# Patient Record
Sex: FEMALE | Race: WHITE | NOT HISPANIC OR LATINO
[De-identification: names, ages, dates, MRNs, and addresses within clinical notes are randomized per-mention and may not be internally consistent; named-entity substitution may affect disease eponyms.]

---

## 2017-09-28 ENCOUNTER — CHARTING TRANS (OUTPATIENT)
Dept: OTHER | Age: 72
End: 2017-09-28

## 2017-11-29 PROCEDURE — 82570 ASSAY OF URINE CREATININE: CPT | Performed by: INTERNAL MEDICINE

## 2017-11-29 PROCEDURE — 82043 UR ALBUMIN QUANTITATIVE: CPT | Performed by: INTERNAL MEDICINE

## 2017-12-05 PROBLEM — J32.9 SINUSITIS, UNSPECIFIED CHRONICITY, UNSPECIFIED LOCATION: Status: ACTIVE | Noted: 2017-12-05

## 2017-12-05 PROBLEM — I10 ESSENTIAL HYPERTENSION: Status: ACTIVE | Noted: 2017-12-05

## 2017-12-05 PROBLEM — E11.8 TYPE 2 DIABETES MELLITUS WITH COMPLICATION, WITHOUT LONG-TERM CURRENT USE OF INSULIN (HCC): Status: ACTIVE | Noted: 2017-12-05

## 2018-02-17 ENCOUNTER — CHARTING TRANS (OUTPATIENT)
Dept: OTHER | Age: 73
End: 2018-02-17

## 2018-04-24 ENCOUNTER — CHARTING TRANS (OUTPATIENT)
Dept: OTHER | Age: 73
End: 2018-04-24

## 2018-06-22 PROCEDURE — 82043 UR ALBUMIN QUANTITATIVE: CPT | Performed by: INTERNAL MEDICINE

## 2018-06-22 PROCEDURE — 82570 ASSAY OF URINE CREATININE: CPT | Performed by: INTERNAL MEDICINE

## 2018-10-26 ENCOUNTER — CHARTING TRANS (OUTPATIENT)
Dept: OTHER | Age: 73
End: 2018-10-26

## 2018-11-01 VITALS
DIASTOLIC BLOOD PRESSURE: 90 MMHG | SYSTOLIC BLOOD PRESSURE: 125 MMHG | WEIGHT: 240.08 LBS | BODY MASS INDEX: 44.18 KG/M2 | HEART RATE: 103 BPM | TEMPERATURE: 98.8 F | HEIGHT: 62 IN | RESPIRATION RATE: 18 BRPM | OXYGEN SATURATION: 95 %

## 2018-11-01 VITALS
HEART RATE: 65 BPM | HEIGHT: 62 IN | RESPIRATION RATE: 16 BRPM | BODY MASS INDEX: 42.88 KG/M2 | TEMPERATURE: 98.8 F | SYSTOLIC BLOOD PRESSURE: 128 MMHG | OXYGEN SATURATION: 96 % | WEIGHT: 233 LBS | DIASTOLIC BLOOD PRESSURE: 98 MMHG

## 2018-11-19 PROCEDURE — 82570 ASSAY OF URINE CREATININE: CPT | Performed by: INTERNAL MEDICINE

## 2018-11-19 PROCEDURE — 82043 UR ALBUMIN QUANTITATIVE: CPT | Performed by: INTERNAL MEDICINE

## 2018-11-27 VITALS — TEMPERATURE: 98.7 F

## 2019-03-19 ENCOUNTER — WALK IN (OUTPATIENT)
Dept: URGENT CARE | Age: 74
End: 2019-03-19

## 2019-03-19 VITALS
BODY MASS INDEX: 50.23 KG/M2 | OXYGEN SATURATION: 93 % | DIASTOLIC BLOOD PRESSURE: 86 MMHG | SYSTOLIC BLOOD PRESSURE: 142 MMHG | WEIGHT: 272.93 LBS | HEART RATE: 81 BPM | HEIGHT: 62 IN | RESPIRATION RATE: 16 BRPM | TEMPERATURE: 98.6 F

## 2019-03-19 DIAGNOSIS — J01.00 ACUTE NON-RECURRENT MAXILLARY SINUSITIS: Primary | ICD-10-CM

## 2019-03-19 PROCEDURE — 99213 OFFICE O/P EST LOW 20 MIN: CPT | Performed by: NURSE PRACTITIONER

## 2019-03-19 RX ORDER — AMOXICILLIN AND CLAVULANATE POTASSIUM 875; 125 MG/1; MG/1
1 TABLET, FILM COATED ORAL 2 TIMES DAILY
Qty: 20 TABLET | Refills: 0 | Status: SHIPPED | OUTPATIENT
Start: 2019-03-19 | End: 2019-03-29

## 2019-03-19 ASSESSMENT — ENCOUNTER SYMPTOMS
GASTROINTESTINAL NEGATIVE: 1
FATIGUE: 1
EYES NEGATIVE: 1
TROUBLE SWALLOWING: 0
COUGH: 1
HEADACHES: 1
SORE THROAT: 1
SINUS PRESSURE: 1
SINUS PAIN: 1
RHINORRHEA: 1

## 2020-01-13 PROBLEM — M54.42 CHRONIC BILATERAL LOW BACK PAIN WITH LEFT-SIDED SCIATICA: Status: ACTIVE | Noted: 2020-01-13

## 2020-01-13 PROBLEM — G89.29 CHRONIC BILATERAL LOW BACK PAIN WITH LEFT-SIDED SCIATICA: Status: ACTIVE | Noted: 2020-01-13

## 2020-01-13 PROBLEM — M43.16 SPONDYLOLISTHESIS, LUMBAR REGION: Status: ACTIVE | Noted: 2020-01-13

## 2020-01-23 PROBLEM — M51.26 LUMBAR HERNIATED DISC: Status: ACTIVE | Noted: 2020-01-23

## 2020-02-10 PROBLEM — M48.062 NEUROGENIC CLAUDICATION DUE TO LUMBAR SPINAL STENOSIS: Status: ACTIVE | Noted: 2020-02-10

## 2020-02-14 RX ORDER — GABAPENTIN 300 MG/1
300 CAPSULE ORAL 2 TIMES DAILY
COMMUNITY
End: 2020-12-24

## 2020-02-18 ENCOUNTER — APPOINTMENT (OUTPATIENT)
Dept: GENERAL RADIOLOGY | Facility: HOSPITAL | Age: 75
End: 2020-02-18
Attending: PHYSICAL MEDICINE & REHABILITATION
Payer: MEDICARE

## 2020-02-18 ENCOUNTER — HOSPITAL ENCOUNTER (OUTPATIENT)
Facility: HOSPITAL | Age: 75
Setting detail: HOSPITAL OUTPATIENT SURGERY
Discharge: HOME | End: 2020-02-18
Attending: PHYSICAL MEDICINE & REHABILITATION | Admitting: PHYSICAL MEDICINE & REHABILITATION
Payer: MEDICARE

## 2020-02-18 VITALS
HEIGHT: 62 IN | HEART RATE: 99 BPM | OXYGEN SATURATION: 94 % | WEIGHT: 281 LBS | BODY MASS INDEX: 51.71 KG/M2 | DIASTOLIC BLOOD PRESSURE: 100 MMHG | SYSTOLIC BLOOD PRESSURE: 187 MMHG

## 2020-02-18 PROCEDURE — 3E0R3BZ INTRODUCTION OF ANESTHETIC AGENT INTO SPINAL CANAL, PERCUTANEOUS APPROACH: ICD-10-PCS | Performed by: PHYSICAL MEDICINE & REHABILITATION

## 2020-02-18 PROCEDURE — 3E0R33Z INTRODUCTION OF ANTI-INFLAMMATORY INTO SPINAL CANAL, PERCUTANEOUS APPROACH: ICD-10-PCS | Performed by: PHYSICAL MEDICINE & REHABILITATION

## 2020-02-18 NOTE — H&P
As per 2/10/20 office note:    HPI:   Milton Eaton  presents with complaints of Low back pain radiating to left lower limb.   Date the pain began: 2 months     Thank you, Mrs Ayla Calderon,  for referring me your patient for the evaluation of the th Social History       Socioeconomic History      Marital status: Single      Spouse name: Not on file      Number of children: Not on file      Years of education: Not on file      Highest education level: Not on file    Occupational History      Not on penny •  BASAGLAR KWIKPEN 100 UNIT/ML Subcutaneous Solution Pen-injector, INJECT 92 UNITS INTO THE SKIN NIGHTLY, Disp: 30 mL, Rfl: 0  •  Insulin Aspart Pen (NOVOLOG FLEXPEN) 100 UNIT/ML Subcutaneous Solution Pen-injector, Inject 15 Units into the skin 3 (three) HENT: Negative. Eyes: Negative. Respiratory: Negative. Cardiovascular: Negative. Gastrointestinal: Negative. Genitourinary: Negative. Musculoskeletal: Positive for back pain. Skin: Negative. Neurological: Positive for numbness.  [facility] MRI, lumbar spine, 1/19/20: Moderate central, mild right and moderate left foraminal stenosis with spondylolisthesis L4-5.  See report.             ///     Mri Spine Lumbar ([id number])     Result Date: 1/20/2020 DATE OF SERVICE: 01.19.2020 MRI OF THE LUMBAR SPINE WITHOUT CONTRAST.  INDICATION:  Low back pain and left sciatica COMPARISON:  January 3, 2020 radiographs TECHNIQUE: Sagittal and axial T2, sagittal T1, sagittal STIR, and sagittal T1 in and out of phase se protrusion, disc desiccation, a moderate disc bulge, moderate to severe facet arthrosis with facet joint effusions, ligamentum flavum thickening, and uncovering of the disc secondary grade 1 anterolisthesis.  There is resultant severe central spinal canal s New, Level III [14376]        Chronic bilateral low back pain with left-sided sciatica     She will continue her home exercise program to maintain core strength, lumbar and lower limb strength, flexibility and endurance.      She may consider a diagnos

## 2020-02-18 NOTE — OPERATIVE REPORT
PATIENT NAME: Codie Cadena   : 3/8/1945  MRN: [id number]   DATE OF OPERATION: 2020      PREOPERATIVE DIAGNOSIS: spondylolisthesis lumbar region @DX@     POSTOPERATIVE DIAGNOSIS: spondylolisthesis lumbar region @DX@     PROCEDURE PERFORMED:  Sean Waldron fluoroscopy,    and extension tube(s), a 1-2 cc volume of contrast was injected and flow of contrast was noted at each level. No intravascular uptake was demonstrated. Radiographs were obtained for documentation purposes.   The injectate was administered

## 2020-06-01 PROBLEM — E11.65 UNCONTROLLED TYPE 2 DIABETES MELLITUS WITH HYPERGLYCEMIA (HCC): Status: ACTIVE | Noted: 2017-12-05

## 2020-08-20 PROBLEM — E78.00 HYPERCHOLESTEROLEMIA: Status: ACTIVE | Noted: 2020-08-20

## 2020-08-20 PROBLEM — K52.9 COLITIS: Status: ACTIVE | Noted: 2020-07-02

## 2020-08-20 PROBLEM — E11.9 DIABETES MELLITUS (HCC): Status: ACTIVE | Noted: 2020-08-20

## 2020-08-20 PROBLEM — K57.92 ACUTE DIVERTICULITIS OF INTESTINE: Status: ACTIVE | Noted: 2020-07-02

## 2020-08-20 PROBLEM — H35.30 MACULAR DEGENERATION: Status: ACTIVE | Noted: 2020-08-20

## 2020-08-20 PROBLEM — R09.89 CHEST CONGESTION: Status: ACTIVE | Noted: 2020-07-06

## 2020-08-20 PROBLEM — E03.9 HYPOTHYROIDISM: Status: ACTIVE | Noted: 2020-08-20

## 2020-11-03 PROBLEM — M17.0 PRIMARY OSTEOARTHRITIS OF BOTH KNEES: Status: ACTIVE | Noted: 2020-11-03

## 2021-03-05 DIAGNOSIS — Z23 NEED FOR VACCINATION: ICD-10-CM

## 2021-03-17 PROBLEM — M54.41 CHRONIC BILATERAL LOW BACK PAIN WITH BILATERAL SCIATICA: Status: ACTIVE | Noted: 2020-01-13

## 2021-03-17 PROBLEM — M54.42 CHRONIC BILATERAL LOW BACK PAIN WITH BILATERAL SCIATICA: Status: ACTIVE | Noted: 2020-01-13

## 2021-03-17 PROBLEM — G89.29 CHRONIC BILATERAL LOW BACK PAIN WITH BILATERAL SCIATICA: Status: ACTIVE | Noted: 2020-01-13

## 2021-07-20 PROBLEM — K50.111 CROHN'S DISEASE OF LARGE INTESTINE WITH RECTAL BLEEDING (HCC): Status: ACTIVE | Noted: 2021-07-20

## 2021-07-20 PROBLEM — J44.9 CHRONIC OBSTRUCTIVE PULMONARY DISEASE, UNSPECIFIED COPD TYPE (HCC): Status: ACTIVE | Noted: 2021-07-20

## 2021-07-20 PROBLEM — E66.01 OBESITY, MORBID (HCC): Status: ACTIVE | Noted: 2021-07-20

## (undated) DEVICE — EXTENSION SET MICROBORE

## (undated) DEVICE — OMNIPAQUE 240ML VIAL

## (undated) DEVICE — ENCORE® LATEX ACCLAIM SIZE 7.5, STERILE LATEX POWDER-FREE SURGICAL GLOVE: Brand: ENCORE

## (undated) DEVICE — PERIFIX® 18 GA. X 3-1/2 IN. (90 MM) TUOHY, 5 ML GLASS LUER LOCK LOR TRAY (KIT): Brand: PERIFIX®

## (undated) DEVICE — NEEDLE SPINAL 22X7 405149

## (undated) DEVICE — CHLORAPREP ORANGE TINT 10.5ML

## (undated) DEVICE — 6 ML SYRINGE LUER-LOCK TIP: Brand: MONOJECT